# Patient Record
Sex: MALE | Race: WHITE | Employment: OTHER | URBAN - NONMETROPOLITAN AREA
[De-identification: names, ages, dates, MRNs, and addresses within clinical notes are randomized per-mention and may not be internally consistent; named-entity substitution may affect disease eponyms.]

---

## 2020-09-05 ENCOUNTER — HOSPITAL ENCOUNTER (EMERGENCY)
Age: 60
Discharge: HOME | End: 2020-09-05
Payer: MEDICARE

## 2020-09-05 VITALS
WEIGHT: 180 LBS | OXYGEN SATURATION: 97 % | TEMPERATURE: 98 F | HEART RATE: 102 BPM | DIASTOLIC BLOOD PRESSURE: 79 MMHG | RESPIRATION RATE: 16 BRPM | SYSTOLIC BLOOD PRESSURE: 138 MMHG

## 2020-09-05 PROCEDURE — 90715 TDAP VACCINE 7 YRS/> IM: CPT | Performed by: NURSE PRACTITIONER

## 2020-09-05 PROCEDURE — 12002 RPR S/N/AX/GEN/TRNK2.6-7.5CM: CPT | Performed by: NURSE PRACTITIONER

## 2020-09-05 PROCEDURE — 90471 IMMUNIZATION ADMIN: CPT | Performed by: NURSE PRACTITIONER

## 2020-09-05 PROCEDURE — 6360000002 HC RX W HCPCS: Performed by: NURSE PRACTITIONER

## 2020-09-05 PROCEDURE — 99202 OFFICE O/P NEW SF 15 MIN: CPT | Performed by: NURSE PRACTITIONER

## 2020-09-05 PROCEDURE — L3933 FO W/O JOINTS CF: HCPCS

## 2020-09-05 PROCEDURE — 99212 OFFICE O/P EST SF 10 MIN: CPT

## 2020-09-05 RX ORDER — LIDOCAINE HYDROCHLORIDE 20 MG/ML
5 INJECTION, SOLUTION INFILTRATION; PERINEURAL ONCE
Status: DISCONTINUED | OUTPATIENT
Start: 2020-09-05 | End: 2020-09-06 | Stop reason: HOSPADM

## 2020-09-05 RX ORDER — LISINOPRIL 10 MG/1
10 TABLET ORAL DAILY
COMMUNITY

## 2020-09-05 RX ORDER — ACETAMINOPHEN 500 MG
500 TABLET ORAL EVERY 6 HOURS PRN
Qty: 120 TABLET | Refills: 0 | COMMUNITY
Start: 2020-09-05

## 2020-09-05 RX ORDER — FLUOXETINE 10 MG/1
10 CAPSULE ORAL DAILY
COMMUNITY

## 2020-09-05 RX ADMIN — TETANUS TOXOID, REDUCED DIPHTHERIA TOXOID AND ACELLULAR PERTUSSIS VACCINE, ADSORBED 0.5 ML: 5; 2.5; 8; 8; 2.5 SUSPENSION INTRAMUSCULAR at 19:37

## 2020-09-05 NOTE — ED PROVIDER NOTES
cancer [facility])     Liver disease        SURGICAL HISTORY     Patient  has a past surgical history that includes Colon surgery; fracture surgery; Abdomen surgery; and Splenectomy. CURRENT MEDICATIONS       Discharge Medication List as of 9/5/2020  8:08 PM      CONTINUE these medications which have NOT CHANGED    Details   lisinopril (PRINIVIL;ZESTRIL) 10 MG tablet Take 10 mg by mouth dailyHistorical Med      FLUoxetine (PROZAC) 10 MG capsule Take 10 mg by mouth dailyHistorical Med             ALLERGIES     Patient is has No Known Allergies. FAMILY HISTORY     Patient's family history is not on file. SOCIAL HISTORY     Patient  reports that he has been smoking. He has never used smokeless tobacco. He reports previous alcohol use. He reports current drug use. Drug: Marijuana. PHYSICAL EXAM     ED TRIAGE VITALS  BP: 138/79, Temp: 98 °F (36.7 °C), Pulse: 102, Resp: 16, SpO2: 97 %  Physical Exam  Vitals signs and nursing note reviewed. Constitutional:       General: He is not in acute distress. Appearance: Normal appearance. He is not ill-appearing, toxic-appearing or diaphoretic. HENT:      Head: Normocephalic and atraumatic. Eyes:      Extraocular Movements: Extraocular movements intact. Conjunctiva/sclera: Conjunctivae normal.   Cardiovascular:      Pulses: Normal pulses. Pulmonary:      Effort: Pulmonary effort is normal.   Musculoskeletal: Normal range of motion. Right hand: He exhibits laceration. He exhibits normal range of motion, no tenderness, no bony tenderness, normal two-point discrimination, normal capillary refill, no deformity and no swelling. Normal sensation noted. Normal strength noted. Skin:     General: Skin is warm. Capillary Refill: Capillary refill takes less than 2 seconds. Findings: Laceration present. Comments: 2 internal 5-0 P-3 Vicryl, 11 external paced 4-0  PS-2 Ethilon . 5 mls lidocaine 2% digital block completed per protocol. Neurological:      General: No focal deficit present. Mental Status: He is alert and oriented to person, place, and time. Psychiatric:         Mood and Affect: Mood normal.         Behavior: Behavior normal.         Thought Content: Thought content normal.         Judgment: Judgment normal.     Lac Repair    Date/Time: 9/8/2020 8:38 AM  Performed by: MARY Rice CNP  Authorized by: MARY Rice CNP     Consent:     Consent obtained:  Written    Consent given by:  Patient    Risks discussed:  Pain, infection, retained foreign body, poor cosmetic result, nerve damage, poor wound healing and vascular damage    Alternatives discussed:  Delayed treatment and referral  Anesthesia (see MAR for exact dosages):      Anesthesia method:  Local infiltration and nerve block    Local anesthetic:  Lidocaine 2% w/o epi    Block location:  Right middle finger    Block needle gauge:  27 G    Block anesthetic:  Lidocaine 2% w/o epi    Block technique:  Traditional subcutaneous    Block injection procedure:  Introduced needle and anatomic landmarks identified    Block outcome:  Anesthesia achieved  Laceration details:     Location:  Finger    Finger location:  R long finger    Length (cm):  3    Depth (mm):  1  Repair type:     Repair type:  Complex  Pre-procedure details:     Preparation:  Patient was prepped and draped in usual sterile fashion  Exploration:     Limited defect created (wound extended): no      Hemostasis achieved with:  Direct pressure    Wound exploration: wound explored through full range of motion and entire depth of wound probed and visualized      Wound extent: areolar tissue violated, nerve damage and vascular damage      Contaminated: no    Treatment:     Area cleansed with:  Betadine    Amount of cleaning:  Extensive    Irrigation method:  Syringe    Visualized foreign bodies/material removed: no      Debridement:  None    Scar revision: no    Subcutaneous repair:     Suture size:  5-0    Suture material:  Vicryl    Suture technique:  Simple interrupted    Number of sutures:  2  Skin repair:     Repair method:  Sutures    Suture size:  4-0    Suture material:  Nylon    Suture technique:  Simple interrupted    Number of sutures:  11  Approximation:     Approximation:  Close  Post-procedure details:     Dressing:  Non-adherent dressing, sterile dressing and splint for protection    Patient tolerance of procedure: Tolerated well, no immediate complications  Comments:      Vidal tolerated the procedure well denied concerns or complaints at the time. DIAGNOSTIC RESULTS   Labs:No results found for this visit on 09/05/20. IMAGING:  No orders to display     URGENT CARE COURSE:     Vitals:    09/05/20 1919   BP: 138/79   Pulse: 102   Resp: 16   Temp: 98 °F (36.7 °C)   TempSrc: Temporal   SpO2: 97%   Weight: 180 lb (81.6 kg)       Medications   Tetanus-Diphth-Acell Pertussis (BOOSTRIX) injection 0.5 mL (0.5 mLs Intramuscular Given 9/5/20 1937)     PROCEDURES:  None  FINAL IMPRESSION      1. Laceration of right middle finger without foreign body without damage to nail, initial encounter        DISPOSITION/PLAN   Decision To Discharge    Monitor for redness, drainage, pain   Monitor for any fevers  Keep clean and dry  Take medication as directed  Follow up with your PCP or return for suture removal in 7-10 days  or go to the Emergency Department for any concerns. Printed educational material given.     PATIENT REFERRED TO:  [address] [place] [address] Urgent Care  [address] [id number] [phone]    As needed    [facility]  [address] [phone]  Go today  If symptoms worsen    DISCHARGE MEDICATIONS:  Discharge Medication List as of 9/5/2020  8:08 PM      START taking these medications    Details   acetaminophen (APAP EXTRA STRENGTH) 500 MG tablet Take 1 tablet by mouth every 6 hours as needed for Pain, Disp-120 tablet,R-[id number] Discharge Medication List as of 9/5/2020  8:08 PM          MARY Soria CNP, APRN - CNP  09/08/20 [id number]

## 2020-09-05 NOTE — ED TRIAGE NOTES
Patient to urgent care states moving a metal hot water heater, cutting right middle finger, bleeding controlled by ice and pressure patient used at home.

## 2020-09-08 ENCOUNTER — HOSPITAL ENCOUNTER (EMERGENCY)
Age: 60
Discharge: HOME | End: 2020-09-08
Payer: MEDICARE

## 2020-09-08 VITALS
DIASTOLIC BLOOD PRESSURE: 67 MMHG | SYSTOLIC BLOOD PRESSURE: 115 MMHG | HEART RATE: 88 BPM | TEMPERATURE: 97.7 F | WEIGHT: 180 LBS | RESPIRATION RATE: 18 BRPM | OXYGEN SATURATION: 96 %

## 2020-09-08 PROCEDURE — 99213 OFFICE O/P EST LOW 20 MIN: CPT

## 2020-09-08 PROCEDURE — 99213 OFFICE O/P EST LOW 20 MIN: CPT | Performed by: NURSE PRACTITIONER

## 2020-09-08 RX ORDER — CLINDAMYCIN HYDROCHLORIDE 300 MG/1
300 CAPSULE ORAL 3 TIMES DAILY
Qty: 30 CAPSULE | Refills: 0 | Status: SHIPPED | OUTPATIENT
Start: 2020-09-08 | End: 2020-09-18

## 2020-09-08 RX ORDER — CEPHALEXIN 250 MG/1
500 CAPSULE ORAL 3 TIMES DAILY
Qty: 60 CAPSULE | Refills: 0 | Status: SHIPPED | OUTPATIENT
Start: 2020-09-08 | End: 2020-09-18

## 2020-09-08 ASSESSMENT — PAIN DESCRIPTION - LOCATION: LOCATION: FINGER (COMMENT WHICH ONE)

## 2020-09-08 ASSESSMENT — ENCOUNTER SYMPTOMS
COUGH: 0
COUGH: 0
STRIDOR: 0
WHEEZING: 0
STRIDOR: 0
CHOKING: 0
BACK PAIN: 0
WHEEZING: 0
COLOR CHANGE: 1
COLOR CHANGE: 0
APNEA: 0
CHEST TIGHTNESS: 0
CHEST TIGHTNESS: 0
SHORTNESS OF BREATH: 0
CHOKING: 0
APNEA: 0
SHORTNESS OF BREATH: 0

## 2020-09-08 ASSESSMENT — PAIN SCALES - GENERAL: PAINLEVEL_OUTOF10: 5

## 2020-09-08 ASSESSMENT — PAIN DESCRIPTION - PAIN TYPE: TYPE: ACUTE PAIN

## 2020-09-08 ASSESSMENT — PAIN DESCRIPTION - DESCRIPTORS: DESCRIPTORS: THROBBING

## 2020-09-08 NOTE — ED NOTES
Telfa, sterile guaze applied to pt's right middle finger. Pt discharged. Pt verbalized understanding of discharge instructions and scripts. Pt walked out per self in stable condition.      Vinny Rosales LPN  36/23/56 [id number]

## 2020-09-08 NOTE — ED TRIAGE NOTES
Pt walked to room 4. Pt here with wanting to get his stitches in right middle finger checked out. Pt had them put in here on Sunday September 6. Pt got stitches wet and states finger is throbbing. Pt wants an antibiotic.

## 2020-09-08 NOTE — ED PROVIDER NOTES
[place] [id number]  Urgent Care Encounter      CHIEF COMPLAINT       Chief Complaint   Patient presents with    Wound Check     Pt wants stitches checked that he had put in Sunday Sept 6. Pt got stitches wet. Thinks he needs an antibiotic. Nurses Notes reviewed and I agree except as noted in the HPI. HISTORY OFPRESENT ILLNESS   Lucio Phelan is a 61 y.o. The history is provided by the patient. No  was used. Wound Check    He was treated in the ED 3 to 5 days ago. Previous treatment in the ED includes laceration repair and wound cleansing or irrigation. Treatments since wound repair include regular soap and water washings. Fever duration: none. There has been colored (\"milky\") discharge from the wound. There is new redness present. There is new swelling present. There is new pain present. He has no difficulty moving the affected extremity or digit. REVIEW OF SYSTEMS     Review of Systems   Constitutional: Negative for activity change, appetite change, chills, diaphoresis, fatigue, fever and unexpected weight change. Respiratory: Negative for apnea, cough, choking, chest tightness, shortness of breath, wheezing and stridor. Cardiovascular: Negative for chest pain, palpitations and leg swelling. Musculoskeletal: Positive for joint swelling and myalgias. Skin: Positive for color change and wound. Neurological: Positive for numbness. Not new         PAST MEDICAL HISTORY         Diagnosis Date    Cancer [facility])     colon    Depression     Hypertension     [facility] ([facility]     [facility]     Liver disease        SURGICAL HISTORY     Patient  has a past surgical history that includes Colon surgery; fracture surgery; Abdomen surgery; and Splenectomy.     CURRENT MEDICATIONS       Previous Medications    ACETAMINOPHEN (APAP EXTRA STRENGTH) 500 MG TABLET    Take 1 tablet by mouth every 6 hours as needed for Pain    FLUOXETINE (PROZAC) 10 MG concerns   or go to the Emergency Department.     PATIENT REFERRED TO:  [facility]  [address] [phone]  Go today  If symptoms worsen    [address] [facility]  [id number] [facility]  [phone]        DISCHARGE MEDICATIONS:  New Prescriptions    CEPHALEXIN (KEFLEX) 250 MG CAPSULE    Take 2 capsules by mouth 3 times daily for 10 days    CLINDAMYCIN (CLEOCIN) 300 MG CAPSULE    Take 1 capsule by mouth 3 times daily for 10 days     Current Discharge Medication List          MARY Faria CNP, APRN - CNP  09/08/20 102